# Patient Record
Sex: FEMALE | Race: WHITE | NOT HISPANIC OR LATINO | Employment: PART TIME | URBAN - METROPOLITAN AREA
[De-identification: names, ages, dates, MRNs, and addresses within clinical notes are randomized per-mention and may not be internally consistent; named-entity substitution may affect disease eponyms.]

---

## 2017-02-06 ENCOUNTER — GENERIC CONVERSION - ENCOUNTER (OUTPATIENT)
Dept: OTHER | Facility: OTHER | Age: 67
End: 2017-02-06

## 2017-02-22 ENCOUNTER — GENERIC CONVERSION - ENCOUNTER (OUTPATIENT)
Dept: OTHER | Facility: OTHER | Age: 67
End: 2017-02-22

## 2017-03-02 ENCOUNTER — GENERIC CONVERSION - ENCOUNTER (OUTPATIENT)
Dept: OTHER | Facility: OTHER | Age: 67
End: 2017-03-02

## 2017-03-20 ENCOUNTER — GENERIC CONVERSION - ENCOUNTER (OUTPATIENT)
Dept: OTHER | Facility: OTHER | Age: 67
End: 2017-03-20

## 2017-03-27 ENCOUNTER — GENERIC CONVERSION - ENCOUNTER (OUTPATIENT)
Dept: OTHER | Facility: OTHER | Age: 67
End: 2017-03-27

## 2017-03-27 ENCOUNTER — APPOINTMENT (OUTPATIENT)
Dept: RADIATION ONCOLOGY | Facility: CLINIC | Age: 67
End: 2017-03-27
Attending: RADIOLOGY
Payer: COMMERCIAL

## 2017-03-27 ENCOUNTER — APPOINTMENT (OUTPATIENT)
Dept: LAB | Facility: CLINIC | Age: 67
End: 2017-03-27
Attending: RADIOLOGY
Payer: COMMERCIAL

## 2017-03-27 ENCOUNTER — TRANSCRIBE ORDERS (OUTPATIENT)
Dept: ADMINISTRATIVE | Facility: HOSPITAL | Age: 67
End: 2017-03-27

## 2017-03-27 DIAGNOSIS — C30.0 MALIGNANT NEOPLASM OF NASAL CAVITY (HCC): ICD-10-CM

## 2017-03-27 DIAGNOSIS — C30.0 MALIGNANT NEOPLASM OF NASAL CAVITIES (HCC): Primary | ICD-10-CM

## 2017-03-27 LAB
BUN SERPL-MCNC: 14 MG/DL (ref 5–25)
CREAT SERPL-MCNC: 0.69 MG/DL (ref 0.6–1.3)
GFR SERPL CREATININE-BSD FRML MDRD: >60 ML/MIN/1.73SQ M

## 2017-03-27 PROCEDURE — 82565 ASSAY OF CREATININE: CPT

## 2017-03-27 PROCEDURE — 99214 OFFICE O/P EST MOD 30 MIN: CPT | Performed by: RADIOLOGY

## 2017-03-27 PROCEDURE — 36415 COLL VENOUS BLD VENIPUNCTURE: CPT

## 2017-03-27 PROCEDURE — 84520 ASSAY OF UREA NITROGEN: CPT

## 2017-03-29 ENCOUNTER — HOSPITAL ENCOUNTER (OUTPATIENT)
Dept: RADIOLOGY | Facility: HOSPITAL | Age: 67
Discharge: HOME | End: 2017-03-29
Attending: RADIOLOGY
Payer: COMMERCIAL

## 2017-03-29 DIAGNOSIS — Z76.89 REFERRAL OF PATIENT WITHOUT EXAMINATION OR TREATMENT: ICD-10-CM

## 2017-03-31 ENCOUNTER — LAB REQUISITION (OUTPATIENT)
Dept: LAB | Facility: HOSPITAL | Age: 67
End: 2017-03-31
Payer: COMMERCIAL

## 2017-03-31 DIAGNOSIS — C30.0 MALIGNANT NEOPLASM OF NASAL CAVITY (HCC): ICD-10-CM

## 2017-03-31 PROCEDURE — 88321 CONSLTJ&REPRT SLD PREP ELSWR: CPT | Performed by: RADIOLOGY

## 2017-04-06 ENCOUNTER — HOSPITAL ENCOUNTER (OUTPATIENT)
Dept: MRI IMAGING | Facility: HOSPITAL | Age: 67
Discharge: HOME | End: 2017-04-06
Attending: RADIOLOGY
Payer: COMMERCIAL

## 2017-04-06 DIAGNOSIS — C30.0 MALIGNANT NEOPLASM OF NASAL CAVITY (HCC): ICD-10-CM

## 2017-04-06 PROCEDURE — A9585 GADOBUTROL INJECTION: HCPCS | Performed by: RADIOLOGY

## 2017-04-06 PROCEDURE — 70553 MRI BRAIN STEM W/O & W/DYE: CPT

## 2017-04-06 RX ADMIN — GADOBUTROL 11 ML: 604.72 INJECTION INTRAVENOUS at 20:38

## 2018-01-13 VITALS
DIASTOLIC BLOOD PRESSURE: 84 MMHG | HEART RATE: 78 BPM | WEIGHT: 252.6 LBS | BODY MASS INDEX: 42.09 KG/M2 | SYSTOLIC BLOOD PRESSURE: 148 MMHG | HEIGHT: 65 IN | OXYGEN SATURATION: 97 % | RESPIRATION RATE: 18 BRPM

## 2022-05-28 ENCOUNTER — APPOINTMENT (EMERGENCY)
Dept: RADIOLOGY | Facility: HOSPITAL | Age: 72
End: 2022-05-28
Payer: COMMERCIAL

## 2022-05-28 ENCOUNTER — APPOINTMENT (EMERGENCY)
Dept: CT IMAGING | Facility: HOSPITAL | Age: 72
End: 2022-05-28
Payer: COMMERCIAL

## 2022-05-28 ENCOUNTER — HOSPITAL ENCOUNTER (EMERGENCY)
Facility: HOSPITAL | Age: 72
Discharge: HOME | End: 2022-05-28
Attending: EMERGENCY MEDICINE
Payer: COMMERCIAL

## 2022-05-28 VITALS
HEIGHT: 65 IN | HEART RATE: 51 BPM | DIASTOLIC BLOOD PRESSURE: 73 MMHG | RESPIRATION RATE: 18 BRPM | TEMPERATURE: 98.6 F | WEIGHT: 238.1 LBS | BODY MASS INDEX: 39.67 KG/M2 | OXYGEN SATURATION: 97 % | SYSTOLIC BLOOD PRESSURE: 179 MMHG

## 2022-05-28 DIAGNOSIS — S00.31XA ABRASION OF NOSE, INITIAL ENCOUNTER: ICD-10-CM
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
## 2022-05-28 DIAGNOSIS — S06.0X9A CONCUSSION: ICD-10-CM

## 2022-05-28 PROCEDURE — 93005 ELECTROCARDIOGRAM TRACING: CPT

## 2022-05-28 PROCEDURE — 99284 EMERGENCY DEPT VISIT MOD MDM: CPT | Performed by: EMERGENCY MEDICINE

## 2022-05-28 PROCEDURE — 72170 X-RAY EXAM OF PELVIS: CPT

## 2022-05-28 PROCEDURE — 70486 CT MAXILLOFACIAL W/O DYE: CPT

## 2022-05-28 PROCEDURE — 99284 EMERGENCY DEPT VISIT MOD MDM: CPT

## 2022-05-28 PROCEDURE — 90715 TDAP VACCINE 7 YRS/> IM: CPT | Performed by: EMERGENCY MEDICINE

## 2022-05-28 PROCEDURE — 70450 CT HEAD/BRAIN W/O DYE: CPT

## 2022-05-28 PROCEDURE — 71045 X-RAY EXAM CHEST 1 VIEW: CPT

## 2022-05-28 RX ORDER — ACETAMINOPHEN 325 MG/1
650 TABLET ORAL ONCE
Status: COMPLETED | OUTPATIENT
Start: 2022-05-28 | End: 2022-05-28

## 2022-05-28 RX ADMIN — TETANUS TOXOID, REDUCED DIPHTHERIA TOXOID AND ACELLULAR PERTUSSIS VACCINE, ADSORBED 0.5 ML: 5; 2.5; 8; 8; 2.5 SUSPENSION INTRAMUSCULAR at 15:30

## 2022-05-28 RX ADMIN — ACETAMINOPHEN 650 MG: 325 TABLET ORAL at 15:29

## 2022-05-28 NOTE — DISCHARGE INSTRUCTIONS
Thank you for letting us take care of you  You have been evaluated for an accidental fall, possible concussion and abrasion  Please continue using Tylenol at home  Please follow-up with neurology  Please return for worsening symptoms  At this time, you have no clinical evidence of symptoms or problems that will require hospitalization, however you should be evaluated soon by a primary care physician, and contact information has been provided  Follow up with your primary care physician  This is important as many medical conditions can be managed as an outpatient, in addition to routine health screening  Seeing your primary doctor often can help identify changes in the medical issue that brought you to the ED for care today  If you experience any new symptoms or acute worsening of current symptoms, please return to the ED

## 2022-05-28 NOTE — ED PROVIDER NOTES
Emergency Department Trauma Note  Randell Shipley 73 y o  female MRN: [id number]  Unit/Bed#: ED 05/ED 05 Encounter: [id number]      Trauma Alert: Trauma Acuity: Trauma Evaluation  Model of Arrival: Mode of Arrival: BLS via    Trauma Team: Current Providers  Attending Provider: Elda Pritchard MD  Registered Nurse: Allyssa Giron RN  Consultants:     None      History of Present Illness     Chief Complaint:   Chief Complaint   Patient presents with    Fall     Pt arrives via ems from home  Pt was helping  into left chair when he fell and caused her to fall onto her  Upon ems arrival pt was more awake, in route pt became very drowsy and had elevated BP  C-collar placed on pt upon arrival       HPI:  Randell Shipley is a 73 y o  female who presents with facial injury after patient had a fall just prior to arrival   See HPI below  Mechanism:Details of Incident: fall onto  Injury Date: 05/28/22   Injury Occurence Location - [address] [place] [address] Way: home    61-year-old female with past medical history pertinent for tetanus immunization last year presents by EMS for evaluation of nasal pain and left periorbital pain after patient had a fall just prior to arrival when she was trying to catch her  and accidentally fell onto him landing on her face on the floor  Denies any blood thinner use  Denies any loss of consciousness  No neck pain  Denies any upper extremity pains or any chest pain or pelvic pain  No torso pain  Denies any lower extremity pains  Reports that she was lightheaded after she fell but has not been lightheaded since  Reports mild headache at this time  No nausea or vomiting  Denies any blurry vision  Reports that she did have some blood from her nose  C-collar placed on arrival   No other concerns  Review of Systems   Constitutional: Negative for activity change, appetite change, chills and fever  HENT: Negative for congestion, rhinorrhea and sore throat  Abrasion over the nose   Eyes: Negative for visual disturbance  Respiratory: Negative for chest tightness, shortness of breath and wheezing  Cardiovascular: Negative for chest pain, palpitations and leg swelling  Gastrointestinal: Negative for abdominal pain, constipation, diarrhea, nausea and vomiting  Genitourinary: Negative for dysuria, flank pain and pelvic pain  Musculoskeletal: Negative for back pain and neck pain  Skin: Positive for rash ( baseline psoriatic rashes) and wound  Neurological: Positive for light-headedness (None currently) and headaches  Negative for dizziness, tremors, seizures, syncope, facial asymmetry, speech difficulty, weakness and numbness  Psychiatric/Behavioral: Negative for behavioral problems  Historical Information     Immunizations:   Immunization History   Administered Date(s) Administered    COVID-19 J&J (MxBiodevices) vaccine 0 5 mL 03/18/2021    Tdap 05/28/2022       Past Medical History:   Diagnosis Date    GERD (gastroesophageal reflux disease)     High cholesterol     Hypertension      History reviewed  No pertinent family history    Past Surgical History:   Procedure Laterality Date    HYSTERECTOMY      REPLACEMENT TOTAL KNEE BILATERAL      SHOULDER SURGERY       Social History     Tobacco Use    Smoking status: Never Smoker    Smokeless tobacco: Never Used   Substance Use Topics    Alcohol use: Not Currently    Drug use: Never     E-Cigarette/Vaping     E-Cigarette/Vaping Substances       Family History: non-contributory    Meds/Allergies   None       Allergies   Allergen Reactions    Ibuprofen Other (See Comments)    Codeine Other (See Comments)     Makes her "loopy"      Penicillins Rash     redness         PHYSICAL EXAM    PE limited by:  No limitations    Objective   Vitals:   First set: Temperature: 98 2 °F (36 8 °C) (05/28/22 1447)  Pulse: 59 (05/28/22 1447)  Respirations: 18 (05/28/22 1453)  Blood Pressure: (!) 189/86 (05/28/22 1447)  SpO2: 94 % (05/28/22 1447)    Primary Survey:   (A) Airway:  Intact, speaking full sentences  (B) Breathing:  Clear to auscultation bilaterally  (C) Circulation: Pulses:   normal  (D) Disabliity:  GCS Total:  15  (E) Expose:  Completed    Secondary Survey: (Click on Physical Exam tab above)  Physical Exam  Vitals and nursing note reviewed  Constitutional:       General: She is not in acute distress  Appearance: She is well-developed  She is not diaphoretic  HENT:      Head: Normocephalic  Right Ear: Tympanic membrane and external ear normal       Left Ear: Tympanic membrane and external ear normal       Nose:      Comments: Tenderness over the nasal bridge with abrasion over the nose; no septal hematomas noted     Mouth/Throat:      Mouth: Mucous membranes are moist       Comments: Dentition intact without any injuries noted; TMJs without tenderness to palpation  Eyes:      Extraocular Movements: Extraocular movements intact  Pupils: Pupils are equal, round, and reactive to light  Comments: Left periorbital erythema and tenderness to palpation noted; no bruising noted; no injury to the eyes bilaterally   Cardiovascular:      Rate and Rhythm: Normal rate and regular rhythm  Pulses: Normal pulses  Heart sounds: Normal heart sounds  Pulmonary:      Effort: Pulmonary effort is normal  No respiratory distress  Breath sounds: Normal breath sounds  No wheezing or rales  Abdominal:      General: Bowel sounds are normal       Palpations: Abdomen is soft  Tenderness: There is no abdominal tenderness  Musculoskeletal:         General: No tenderness or deformity  Normal range of motion  Cervical back: Normal range of motion and neck supple  Comments: No step-offs, deformities or any midline spinal tenderness noted; C-collar cleared   Skin:     General: Skin is warm and dry  Capillary Refill: Capillary refill takes less than 2 seconds        Findings: Erythema and rash present  Comments: Psoriatic rashes scattered order upper extremities and lower extremities bilaterally   Neurological:      General: No focal deficit present  Mental Status: She is alert and oriented to person, place, and time  Motor: No abnormal muscle tone  Comments: Patient is awake and alert and oriented x 3  No apparent deficits of memory or concentration  Speech is fluent  Fund of knowledge is appropriate  CN II - No field cuts by confrontation  CN III, IV, VI - pupils are 3 mm and briskly reactive  EOMs are full with no nystagmus  CN V - facial sensation is intact  CN VII - no facial asymmetry  CN VIII - auditory acuity is grossly intact to finger rub bilaterally  CN IX - palate rises symmetrically  CN XI - SCM and trapezius muscles are intact  CN XII - tongue protrudes midline  Sensory exam in intact to light touch, pinprick in all dermatomes tested  Coordination is grossly intact for finger-to-nose  5/5 strength in all extremities  Cervical spine cleared by clinical criteria? Yes     Invasive Devices  Report    Peripheral Intravenous Line  Duration           Peripheral IV 05/28/22 Right Antecubital <1 day                Lab Results:   Results Reviewed     None                 Imaging Studies:   Direct to CT: No  TRAUMA - CT head wo contrast   Final Result by Xi Uribe MD (05/28 1533)      No acute intracranial abnormality  Workstation performed: [id number]         TRAUMA - CT facial bones wo contrast   Final Result by Xi Uribe MD (05/28 1534)      Normal noncontrast CT of the facial bones  Workstation performed: [id number]         XR Trauma chest portable   Final Result by Mera Herron DO (05/28 1652)      No acute cardiopulmonary disease                    Workstation performed: [id number]         XR Trauma pelvis ap only 1 or 2 vw   Final Result by Mera Herron DO (05/28 1653)      No acute osseous abnormality  Degenerative changes as described  Workstation performed: [id number]               Procedures  Procedures  Procedures:  FAST: no indication for FAST exam as patient has no injuries to the torso or abdomen and is hemodynamically stable       ED Course         RESULTS:  Results Reviewed     None          TRAUMA - CT head wo contrast   Final Result      No acute intracranial abnormality  Workstation performed: [id number]         TRAUMA - CT facial bones wo contrast   Final Result      Normal noncontrast CT of the facial bones  Workstation performed: [id number]         XR Trauma chest portable   Final Result      No acute cardiopulmonary disease  Workstation performed: [id number]         XR Trauma pelvis ap only 1 or 2 vw   Final Result      No acute osseous abnormality  Degenerative changes as described  Workstation performed: [id number]             Vitals:    05/28/22 1530 05/28/22 1545 05/28/22 1600 05/28/22 1615   BP: (!) 164/104 (!) 158/102 (!) 172/98 (!) 173/82   TempSrc:       Pulse: (!) 52 (!) 49 (!) 49 (!) 49   Resp: 18 21 19 18   Patient Position - Orthostatic VS:    Lying   Temp:               MDM  Number of Diagnoses or Management Options  Diagnosis management comments: 63-year-old female with past medical history pertinent for tetanus immunization last year presents by EMS for evaluation of nasal pain and left periorbital pain after patient had a fall just prior to arrival when she was trying to catch her  and accidentally fell onto him landing on her face on the floor  Vital signs on arrival here were notable for blood pressures in the 190s over 70s  Otherwise vital signs are non concerning  Patient has exam as above  X-rays ordered patient's chest and pelvis to confirm no injuries  No indication for fast exam at this time given lack of injuries to the body and fact that patient is hemodynamically stable  CT imaging ordered of patient's head and face after C-collar cleared  Patient given Tylenol for pain control  Results returned showing no facial injuries  X-rays returned showing no immediate concerns to my interpretation     Patient is already up-to-date on tetanus immunization  Patient ambulated here with a walker  Patient reported still having mild headache and lightheadedness raising suspicion that patient may have a mild concussion  Neurology referral placed as well  Advised following up with her primary care physician, using Tylenol at home and avoiding any bright lights/screen time  Patient was advised following up with her primary care physician and returning for worsening symptoms  Advised using Tylenol for pain control at home          Amount and/or Complexity of Data Reviewed  Tests in the radiology section of CPT®: ordered and reviewed  Tests in the medicine section of CPT®: ordered and reviewed  Obtain history from someone other than the patient: yes  Review and summarize past medical records: yes  Independent visualization of images, tracings, or specimens: yes    Risk of Complications, Morbidity, and/or Mortality  Presenting problems: moderate  Diagnostic procedures: moderate  Management options: moderate            Disposition  [facility] Transfer: No  Final diagnoses:   Fall, initial encounter   Abrasion of nose, initial encounter   Concussion     Time reflects when diagnosis was documented in both MDM as applicable and the Disposition within this note     Time User Action Codes Description Comment    5/28/2022  4:44 PM Papito Grace [I68  GZHD] Fall, initial encounter     5/28/2022  4:44 PM Rehana LI Add [S00 [id number]] Abrasion of nose, initial encounter     5/28/2022  4:44 PM Papito Grace X [facility] Concussion       ED Disposition     ED Disposition   Discharge    Condition   Stable    Date/Time   Sat May 28, 2022  4:44 PM    Shawn Cooney discharge to home/self care                Follow-up Information     Follow up With Specialties Details Why Contact Info    Almaz Becerra MD Family Medicine   R Bharathi Turnernayla [address] [place] [address]  [phone]      Neurology            Patient's Medications    No medications on file         PDMP Review     None          ED Provider  Electronically Signed by         Aline Ramirez MD  05/28/22 0424 [facility] Juan Manuel Coronel MD  05/28/22 1143

## 2022-05-30 LAB
ATRIAL RATE: 54 BPM
P AXIS: 41 DEGREES
PR INTERVAL: 162 MS
QRS AXIS: 70 DEGREES
QRSD INTERVAL: 82 MS
QT INTERVAL: 454 MS
QTC INTERVAL: 430 MS
T WAVE AXIS: 19 DEGREES
VENTRICULAR RATE: 54 BPM

## 2023-08-15 ENCOUNTER — HOSPITAL ENCOUNTER (EMERGENCY)
Facility: HOSPITAL | Age: 73
Discharge: HOME | End: 2023-08-15
Attending: EMERGENCY MEDICINE
Payer: OTHER MISCELLANEOUS

## 2023-08-15 ENCOUNTER — APPOINTMENT (EMERGENCY)
Dept: CT IMAGING | Facility: HOSPITAL | Age: 73
End: 2023-08-15
Payer: OTHER MISCELLANEOUS

## 2023-08-15 ENCOUNTER — APPOINTMENT (EMERGENCY)
Dept: RADIOLOGY | Facility: HOSPITAL | Age: 73
End: 2023-08-15
Payer: OTHER MISCELLANEOUS

## 2023-08-15 VITALS
HEIGHT: 65 IN | SYSTOLIC BLOOD PRESSURE: 168 MMHG | TEMPERATURE: 98.4 F | OXYGEN SATURATION: 95 % | BODY MASS INDEX: 39.67 KG/M2 | DIASTOLIC BLOOD PRESSURE: 78 MMHG | RESPIRATION RATE: 16 BRPM | HEART RATE: 48 BPM | WEIGHT: 238.1 LBS

## 2023-08-15 DIAGNOSIS — T14.8XXA ABRASION: ICD-10-CM

## 2023-08-15 DIAGNOSIS — S80.02XA CONTUSION OF LEFT KNEE, INITIAL ENCOUNTER: Primary | ICD-10-CM

## 2023-08-15 PROCEDURE — G1004 CDSM NDSC: HCPCS

## 2023-08-15 PROCEDURE — 99284 EMERGENCY DEPT VISIT MOD MDM: CPT

## 2023-08-15 PROCEDURE — 73560 X-RAY EXAM OF KNEE 1 OR 2: CPT

## 2023-08-15 PROCEDURE — 70450 CT HEAD/BRAIN W/O DYE: CPT

## 2023-08-15 RX ORDER — ATENOLOL 100 MG/1
100 TABLET ORAL DAILY
COMMUNITY

## 2023-08-15 RX ORDER — ACETAMINOPHEN 325 MG/1
650 TABLET ORAL ONCE
Status: COMPLETED | OUTPATIENT
Start: 2023-08-15 | End: 2023-08-15

## 2023-08-15 RX ORDER — SOLIFENACIN SUCCINATE 5 MG/1
5 TABLET, FILM COATED ORAL DAILY
COMMUNITY

## 2023-08-15 RX ORDER — ESOMEPRAZOLE MAGNESIUM 40 MG/1
40 CAPSULE, DELAYED RELEASE ORAL
COMMUNITY

## 2023-08-15 RX ORDER — ATORVASTATIN CALCIUM 40 MG/1
40 TABLET, FILM COATED ORAL DAILY
COMMUNITY

## 2023-08-15 RX ORDER — BACITRACIN, NEOMYCIN, POLYMYXIN B 400; 3.5; 5 [USP'U]/G; MG/G; [USP'U]/G
1 OINTMENT TOPICAL ONCE
Status: COMPLETED | OUTPATIENT
Start: 2023-08-15 | End: 2023-08-15

## 2023-08-15 RX ADMIN — BACITRACIN ZINC, NEOMYCIN, POLYMYXIN B SULFAT 1 SMALL APPLICATION: 5000; 3.5; 4 OINTMENT TOPICAL at 10:25

## 2023-08-15 RX ADMIN — ACETAMINOPHEN 650 MG: 325 TABLET, FILM COATED ORAL at 10:24

## 2023-08-15 NOTE — ED PROVIDER NOTES
History  Chief Complaint   Patient presents with   • Fall     Pt. Voiced just PTA, she tripped and fell onto left knee while carrying breakfast tray. She voiced that she did had a 2-3 second loss of consciousness. Pt. Denies neck and or back pain and denies taking blood thinners      81year old female presented to the ED for evaluation s/p fall. Patient states PTA shew as ambulating and the tip of her shoe caught on the floor causing her to trip forward. States she landed on her left knee. Reports she did hit her head on a post that was nearby, did not hit head on the floor. When I asked if she loss consciousness she states "I might have for just a second or 2." States she does remember the full event. Admits to pain and abrasion over the left knee and history of knee replacement. She denies any headache, pain in the head, neck pain, back pain. Patient is on no blood thinner. Denies aspirin. Prior to Admission Medications   Prescriptions Last Dose Informant Patient Reported? Taking?   atenolol (TENORMIN) 100 mg tablet   Yes Yes   Sig: Take 100 mg by mouth daily   atorvastatin (LIPITOR) 40 mg tablet   Yes Yes   Sig: Take 40 mg by mouth daily   esomeprazole (NexIUM) 40 MG capsule   Yes Yes   Sig: Take 40 mg by mouth every morning before breakfast   solifenacin (VESICARE) 5 mg tablet   Yes Yes   Sig: Take 5 mg by mouth daily      Facility-Administered Medications: None       Past Medical History:   Diagnosis Date   • GERD (gastroesophageal reflux disease)    • High cholesterol    • Hypertension        Past Surgical History:   Procedure Laterality Date   • CT NEEDLE BIOPSY LUNG  11/29/2013   • CT NEEDLE BIOPSY LUNG  11/20/2013   • HYSTERECTOMY     • REPLACEMENT TOTAL KNEE BILATERAL     • SHOULDER SURGERY         History reviewed. No pertinent family history. I have reviewed and agree with the history as documented.     E-Cigarette/Vaping     E-Cigarette/Vaping Substances     Social History     Tobacco Use   • Smoking status: Never   • Smokeless tobacco: Never   Substance Use Topics   • Alcohol use: Not Currently   • Drug use: Never       Review of Systems   Eyes: Negative for visual disturbance. Respiratory: Negative. Cardiovascular: Negative. Gastrointestinal: Negative for nausea and vomiting. Musculoskeletal: Positive for arthralgias. Negative for neck pain. Skin: Positive for wound. Neurological: Negative for dizziness and headaches. All other systems reviewed and are negative. Physical Exam  Physical Exam  Vitals and nursing note reviewed. Constitutional:       General: She is not in acute distress. Appearance: Normal appearance. She is obese. She is not ill-appearing, toxic-appearing or diaphoretic. HENT:      Head: Normocephalic and atraumatic. No raccoon eyes or Sandhu's sign. Eyes:      Extraocular Movements: Extraocular movements intact. Conjunctiva/sclera: Conjunctivae normal.      Pupils: Pupils are equal, round, and reactive to light. Cardiovascular:      Rate and Rhythm: Normal rate and regular rhythm. Pulmonary:      Effort: Pulmonary effort is normal. No respiratory distress. Breath sounds: Normal breath sounds. No stridor. No wheezing, rhonchi or rales. Chest:      Chest wall: No tenderness. Abdominal:      General: Abdomen is flat. Bowel sounds are normal. There is no distension. Palpations: Abdomen is soft. Tenderness: There is no abdominal tenderness. There is no guarding. Musculoskeletal:         General: Swelling and tenderness present. Cervical back: Full passive range of motion without pain and normal range of motion. No spinous process tenderness or muscular tenderness. Comments: Swelling, tenderness with decreased range of motion due to pain of the left knee. No obvious deformities. Skin:     General: Skin is warm and dry. Capillary Refill: Capillary refill takes less than 2 seconds.       Findings: No erythema or rash. Comments: Superficial abrasion to the left knee   Neurological:      General: No focal deficit present. Mental Status: She is alert and oriented to person, place, and time. Psychiatric:         Mood and Affect: Mood normal.         Vital Signs  ED Triage Vitals [08/15/23 0928]   Temperature Pulse Respirations Blood Pressure SpO2   98.4 °F (36.9 °C) 62 16 (!) 178/77 96 %      Temp Source Heart Rate Source Patient Position - Orthostatic VS BP Location FiO2 (%)   Oral Monitor Lying Right arm --      Pain Score       6           Vitals:    08/15/23 0928 08/15/23 1055 08/15/23 1100   BP: (!) 178/77 150/64 168/78   Pulse: 62 (!) 50 (!) 48   Patient Position - Orthostatic VS: Lying Sitting Lying         Visual Acuity      ED Medications  Medications   neomycin-bacitracin-polymyxin b (NEOSPORIN) ointment 1 small application (1 small application Topical Given 8/15/23 1025)   acetaminophen (TYLENOL) tablet 650 mg (650 mg Oral Given 8/15/23 1024)       Diagnostic Studies  Results Reviewed     None                 CT head without contrast   Final Result by Grey Toscano MD (08/15 1037)      No acute intracranial abnormality. Workstation performed: [id number]         XR knee 1 or 2 views left   Final Result by Arnold Chang MD (08/15 1109)      Unremarkable appearance of total knee arthroplasty. Small joint effusion. Loose bodies. Correlation with any previous outside studies is recommended. Workstation performed: [id number]                    Procedures  Procedures         ED Course  ED Course as of 08/15/23 1444   Tue Aug 15, 2023   1049 CT head: No acute intracranial abnormality. 1057 ED interpretation of x-rays negative for acute osseous injury noted for degenerative changes   1101 Patient has some mild improvement of the knee pain with Tylenol. We discussed rice therapy, follow-up and turn precautions and she verbalized understanding.               SBIRT 22yo+ Flowsheet Row Most Recent Value   Initial Alcohol Screen: US AUDIT-C     1. How often do you have a drink containing alcohol? 0 Filed at: 08/15/2023 1041   2. How many drinks containing alcohol do you have on a typical day you are drinking? 0 Filed at: 08/15/2023 1041   3a. Male UNDER 65: How often do you have five or more drinks on one occasion? 0 Filed at: 08/15/2023 1041   3b. FEMALE Any Age, or MALE 65+: How often do you have 4 or more drinks on one occassion? 0 Filed at: 08/15/2023 1041   Audit-C Score 0 Filed at: 08/15/2023 1041   DAGOBERTO: How many times in the past year have you. .. Used an illegal drug or used a prescription medication for non-medical reasons? Never Filed at: 08/15/2023 1041                    Medical Decision Making  44-year-old female presents the emergency department for evaluation status post fall injuring the left knee. Did have head strike with questionable loss of consciousness. Patient has no headache, visual changes, no traumatic injuries visible on the head. CT of the head was obtained which was negative for acute intracranial abnormality. Patient has superficial abrasion over the left knee which was treated with Neosporin. X-ray was negative for acute fracture or dislocation. Treat with Ace wrap. Able to ambulate with her cane. We discussed rice therapy and follow-up. We discussed return precautions and she verbalized understanding. Patient was clinically hemodynamically stable for discharge    Abrasion: acute illness or injury  Contusion of left knee, initial encounter: acute illness or injury  Amount and/or Complexity of Data Reviewed  Radiology: ordered. Risk  OTC drugs.           Disposition  Final diagnoses:   Contusion of left knee, initial encounter   Abrasion     Time reflects when diagnosis was documented in both MDM as applicable and the Disposition within this note     Time User Action Codes Description Comment    8/15/2023 11:03 AM Teryl Primrose Add [S80.02XA] Contusion of left knee, initial encounter     8/15/2023 11:04 AM Gabe Mensah Add [T14. [address] Abrasion       ED Disposition     ED Disposition   Discharge    Condition   Stable    Date/Time   Tue Aug 15, 2023 11:03 AM    Comment   Treskuldip Maya discharge to home/self care. Follow-up Information     Follow up With Specialties Details Why Contact Info    Judy Barton MD Family Medicine   [address] [place] [address]  [phone]            Discharge Medication List as of 8/15/2023 11:05 AM      CONTINUE these medications which have NOT CHANGED    Details   atenolol (TENORMIN) 100 mg tablet Take 100 mg by mouth daily, Historical Med      atorvastatin (LIPITOR) 40 mg tablet Take 40 mg by mouth daily, Historical Med      esomeprazole (NexIUM) 40 MG capsule Take 40 mg by mouth every morning before breakfast, Historical Med      solifenacin (VESICARE) 5 mg tablet Take 5 mg by mouth daily, Historical Med             No discharge procedures on file.     PDMP Review     None          ED Provider  Electronically Signed by           Gabe Mensah PA-C  08/15/23 3678

## 2023-08-15 NOTE — DISCHARGE INSTRUCTIONS
Please continue to rest the knee, ice and elevate. Alternate between Tylenol and ibuprofen as needed for pain .  these follow-up with your family doctor as needed and return with any new or worsening symptoms